# Patient Record
Sex: MALE | Race: WHITE | Employment: OTHER | URBAN - METROPOLITAN AREA
[De-identification: names, ages, dates, MRNs, and addresses within clinical notes are randomized per-mention and may not be internally consistent; named-entity substitution may affect disease eponyms.]

---

## 2017-02-27 ENCOUNTER — OFFICE VISIT (OUTPATIENT)
Dept: UROLOGY | Facility: CLINIC | Age: 82
End: 2017-02-27
Payer: MEDICARE

## 2017-02-27 ENCOUNTER — TELEPHONE (OUTPATIENT)
Dept: UROLOGY | Facility: CLINIC | Age: 82
End: 2017-02-27

## 2017-02-27 VITALS — SYSTOLIC BLOOD PRESSURE: 113 MMHG | DIASTOLIC BLOOD PRESSURE: 75 MMHG | HEART RATE: 91 BPM

## 2017-02-27 DIAGNOSIS — R33.9 BLADDER RETENTION: Primary | ICD-10-CM

## 2017-02-27 PROCEDURE — 99213 OFFICE O/P EST LOW 20 MIN: CPT | Performed by: UROLOGY

## 2017-02-27 NOTE — TELEPHONE ENCOUNTER
Discussed patient with nurse and provider. Patient scheduled today for a clinic appointment.    Kathy Cunningham [facility]

## 2017-02-27 NOTE — TELEPHONE ENCOUNTER
[facility] Call Center    Phone Message    Name of Caller:Sonja    Phone Number: [phone]  Best time to return call: any    May a detailed message be left on voicemail: yes    Relation to patient: Other Name: Sonja  Relationship: Sonja  Is there legal documentation in chart to discuss information with this person: Yes. Legal Documentation is verified by [facility].    Reason for Call: Patients daughter, Sonja, called and said her dad had called earlier and was told to go to the [facility] ER,  patient needs catheter put back in, and has some bleeding, since 8:00 am , please call to advise    Action Taken: Message routed to:  Adult Clinics: Urology p [id number]

## 2017-02-27 NOTE — MR AVS SNAPSHOT
aPblo Fernandez     (MR # [id number])              After Visit Summary      Visit Information     Date & Time Provider Department Encounter #    2/27/2017  2:30 PM Dave Jorge MD [facility] [id number]      Vitals  Most recent update: 2/27/2017  2:42 PM by Junior Soto CMA    BP Pulse                113/75 91          Reason for visit     RECHECK unable to cath since 8:00 am     Reason for Visit History      Diagnoses        Codes Comments    Bladder retention    -  Primary R33.9       Appointments for Next 1 Year     None      Follow-up and Disposition     Return if symptoms worsen or fail to improve.    Follow-up and Disposition History      Health Maintenance Due     Health Maintenance Due   Topic Date Due    TETANUS IMMUNIZATION (SYSTEM ASSIGNED)  10/03/1950    FALL RISK ASSESSMENT  10/03/1997    PNEUMOCOCCAL (1 of 2 - PCV13) 10/03/1997    INFLUENZA VACCINE (SYSTEM ASSIGNED)  09/01/2016             Ongoing Health Issues     Problem Noted Resolved    Hypercholesterolemia[478682]      Gout[618469]      Asthma[003521]      BPH (benign prostatic hyperplasia)[988460] 1/27/2012     Hip arthritis[768330] 3/18/2013     Advanced directives, counseling/discussion[209457] 3/20/2013     Pleural effusion[488871] 4/9/2013     Anemia due to blood loss, acute[920249] 4/9/2013     Respiratory failure (H)[098154] 4/9/2013     Constipation[235404] 4/12/2013     S/P FELIX[537159] 4/15/2013     Physical deconditioning[441041] 5/10/2013     Pneumonia[438283] 5/16/2013     CHF (congestive heart failure) (H)[768631] 6/6/2013     Urine retention[026588] 6/6/2013     A-fib (H)[580415] 6/12/2013     Ischemic cardiomyopathy[196506] 6/9/2014     Pulmonary hypertension (H)[518496] 6/9/2014     CAD (coronary artery disease)[108176]      Neurogenic bladder[369782] 10/28/2014     Bladder retention[804378] 4/15/2015     Paroxysmal ventricular tachycardia (H)[427.1.ICD-9-CM] 4/28/2015     Urinary retention[645708]  8/29/2015        IMPORTANT INFORMATION  For all doctor appointments, please bring your medications in their original containers .   For all prescription refills please contact your pharmacy directly one week prior to your last dose and request a refill. The pharmacy will then contact us.  If you have any lab tests ordered please call [phone] to schedule a lab appointment.  To contact [facility] call [phone] / for [facility] in [facility] call [phone] / for [facility] in [facility] call [phone].    Pharm[facility]:    [facility]  WRITTEN PRESCRIPTION REQUESTED  [facility]  [facility] [place] [address]    * * * * * * * PATIENT'S COPY* * * * * * * * * *              Stop taking these medicines if you haven't already. Please contact your care team if you have questions.        Reason for Discontinue    carvedilol (COREG) 25 MG tablet Discontinued by another Health Care Provider

## 2017-02-27 NOTE — MR AVS SNAPSHOT
Patient's Medication List   [facility] in [place]       Patient:  GAYATRI BANKS   :  1932   Physician:  Fredy Harmon MD           This is your record.  Keep this with you and show to your community pharmacist(s) and physician(s) at each visit.     Allergies:  ABCIXIMAB - (reactions not documented)     ATORVASTATIN - Other (See Comments)     CONTRAST DYE - Other (See Comments),Nausea               Medications  Valid as of: 2017 - 10:58 PM    Generic Name Brand Name Tablet Size Instructions for use    Albuterol Sulfate   Inhale 2 puffs into the lungs as needed        Apixaban ELIQUIS 5 MG Take 5 mg by mouth 2 times daily        Aspirin aspirin 81 MG Take 1 tablet by mouth daily.        Cyanocobalamin vitamin  B-12 100 MCG Take 1 tablet by mouth daily.        Folic Acid FOLVITE 1 MG Take 1 tablet by mouth daily.        Levothyroxine Sodium SYNTHROID/LEVOTHROID 25 MCG Take 1 tablet by mouth every morning (before breakfast).        Metoprolol Tartrate   Take 25 mg by mouth 2 times daily        Mirtazapine REMERON 15 MG Take 30 mg by mouth At Bedtime         Montelukast Sodium SINGULAIR 10 MG Take 1 tablet by mouth At Bedtime.        Nutritional Supplements ENSURE PLUS HN  Take 8 oz by mouth Before bedtime        Simvastatin ZOCOR 80 MG Take 40 mg by mouth At Bedtime Take 1/2 tablet Daily        Spironolactone ALDACTONE 25 MG Take 0.5 tablets (12.5 mg) by mouth daily        Valsartan DIOVAN 40 MG Take 80 mg by mouth daily        .           .           .           .

## 2017-02-27 NOTE — NURSING NOTE
"Pablo Fernandez's goals for this visit include:   Chief Complaint   Patient presents with     RECHECK     unable to cath since 8:00 am        He requests these members of his care team be copied on today's visit information yes     PCP: Fredy Harmon    Referring Provider:  No referring provider defined for this encounter.    Chief Complaint   Patient presents with     RECHECK     unable to cath since 8:00 am        Initial /75  Pulse 91 Estimated body mass index is 21.33 kg/(m^2) as calculated from the following:    Height as of 8/26/15: 1.803 m (5' 11\").    Weight as of 8/26/15: 69.4 kg (152 lb 14.4 oz).  Medication Reconciliation: complete    Do you need any medication refills at today's visit?       "

## 2017-02-27 NOTE — TELEPHONE ENCOUNTER
Daughter Sonja called stating that her dad self caths and occasionally gets strictures. He was able to get a small amount of urine out at 8:00 this morning but is not able to get the catheter in at all since. Please advise.    Junior Soto,CMA

## 2017-02-27 NOTE — LETTER
2/27/2017       RE: Pablo Fernandez  [address]  [facility] [address]     Dear Colleague,    Thank you for referring your patient, Pablo Fernandez, to the [facility] at [facility]. Please see a copy of my visit note below.    No notes on file    Again, thank you for allowing me to participate in the care of your patient.      Sincerely,    Dave Jorge MD

## 2017-02-28 NOTE — PROGRESS NOTES
Pt well known to urology.    History of BPH and urethral strictures.  He does self cath about 8 times in 24 hours.  He notes some difficulty passing the 14 Latvian coude catheters, he meets a tight stricture at about 5 inches in, but generally can pass the catheter OK each time.    I discussed with him and his family again the various options of:  1. Continue  self cathing  2. Try indwelling barnett for a while to help passively dilate strictures.  3. Urethral dilation with sounds to stretch strictures. (may scar down tighter).  4. Cystoscopy and DVIU under anesthesia.  5. Leave chronic barnett  6. Chronic SPT.    He demonstrated that he was able to catheterize himself in clinic today.   There was no blood and the CIC was snug but not impossible.    I advised to keep catheterizing as he is.  If there is increasing trouble passing the catheter, we can think about a procedure in the future.    15min visit, over 50% face to face in counseling/discussion of above issues.

## 2017-09-21 ENCOUNTER — TELEPHONE (OUTPATIENT)
Dept: UROLOGY | Facility: CLINIC | Age: 82
End: 2017-09-21

## 2017-09-21 NOTE — TELEPHONE ENCOUNTER
Call patient to remind and assist in scheduling yearly follow up with Dr. Jorge due around 11/21/17. I was unable to reach pt left message to return call to clinic.

## 2017-09-29 NOTE — TELEPHONE ENCOUNTER
Patient was contacted and was scheduled on 11/20/17 with Dr. Jorge for annual follow up.    Kathy Cunningham CMA

## 2017-10-25 ENCOUNTER — MEDICAL CORRESPONDENCE (OUTPATIENT)
Dept: HEALTH INFORMATION MANAGEMENT | Facility: CLINIC | Age: 82
End: 2017-10-25

## 2017-11-20 ENCOUNTER — MEDICAL CORRESPONDENCE (OUTPATIENT)
Dept: HEALTH INFORMATION MANAGEMENT | Facility: CLINIC | Age: 82
End: 2017-11-20

## 2017-11-20 ENCOUNTER — OFFICE VISIT (OUTPATIENT)
Dept: UROLOGY | Facility: CLINIC | Age: 82
End: 2017-11-20
Payer: MEDICARE

## 2017-11-20 VITALS — OXYGEN SATURATION: 98 % | SYSTOLIC BLOOD PRESSURE: 121 MMHG | HEART RATE: 70 BPM | DIASTOLIC BLOOD PRESSURE: 72 MMHG

## 2017-11-20 DIAGNOSIS — R33.9 URINE RETENTION: Primary | ICD-10-CM

## 2017-11-20 PROCEDURE — 99212 OFFICE O/P EST SF 10 MIN: CPT | Performed by: UROLOGY

## 2017-11-20 RX ORDER — AMIODARONE HYDROCHLORIDE 200 MG/1
400 TABLET ORAL
COMMUNITY
Start: 2017-10-12

## 2017-11-20 NOTE — MR AVS SNAPSHOT
After Visit Summary   2017    Pablo Fernandez    MRN: [id number]           Patient Information     Date Of Birth          10/3/1932        Visit Information        Provider Department      2017 11:00 AM Dave Jorge MD [facility]        Today's Diagnoses     Urine retention    -  1       Follow-ups after your visit        Follow-up notes from your care team     Return in about 1 year (around 2018), or if symptoms worsen or fail to improve.      Who to contact     If you have questions or need follow up information about today's clinic visit or your schedule please contact [facility] directly at [phone].  Normal or non-critical lab and imaging results will be communicated to you by MyChart, letter or phone within 4 business days after the clinic has received the results. If you do not hear from us within 7 days, please contact the clinic through MyChart or phone. If you have a critical or abnormal lab result, we will notify you by phone as soon as possible.  Submit refill requests through [employer] or call your pharmacy and they will forward the refill request to us. Please allow 3 business days for your refill to be completed.          Additional Information About Your Visit        MyChart Information     [employer] is an electronic gateway that provides easy, online access to your medical records. With [employer], you can request a clinic appointment, read your test results, renew a prescription or communicate with your care team.     To sign up for [employer] visit the website at [email or web address]   You will be asked to enter the access code listed below, as well as some personal information. Please follow the directions to create your username and password.     Your access code is: [email or web address]  Expires: 2018 12:44 PM     Your access code will  in 90 days. If you need help or a new code, please contact your [facility] or call [phone] for assistance.        Care EveryWhere ID     This is your Care EveryWhere ID. This could be used by other organizations to access your [facility] medical records  [id number]        Your Vitals Were     Pulse Pulse Oximetry                70 98%           Blood Pressure from Last 3 Encounters:   11/20/17 121/72   02/27/17 113/75   11/21/16 100/61    Weight from Last 3 Encounters:   08/26/15 69.4 kg (152 lb 14.4 oz)   04/28/15 69.3 kg (152 lb 12.8 oz)   04/15/15 68 kg (150 lb)              Today, you had the following     No orders found for display       Primary Care Provider Office Phone # Fax #    Fredy Harmon [phone]       [facility] [address]  [facility] [address]        Equal Access to Services     [facility] : Hadii aad ku hadasho Soomaali, waaxda luqadaha, qaybta kaalmada adeegyada, waxay idiin hayaan adeivon rand . So [facility] [phone].    ATENCIÓN: Si habla español, tiene a levine disposición servicios gratuitos de asistencia lingüística. Aaron al [phone].    We comply with applicable federal civil rights laws and [place] laws. We do not discriminate on the basis of race, color, national origin, age, disability, sex, sexual orientation, or gender identity.            Thank you!     Thank you for choosing [facility]  for your care. Our goal is always to provide you with excellent care. Hearing back from our patients is one way we can continue to improve our services. Please take a few minutes to complete the written survey that you may receive in the mail after your visit with us. Thank you!             Your Updated Medication List - Protect others around you: Learn how to safely use, store and throw away your medicines at www.disposemymeds.org.          This list is accurate as of: 11/20/17 12:44 PM.  Always use your most recent med list.                   Brand Name  Dispense Instructions for use Diagnosis    amiodarone 200 MG tablet    PACERONE/CODARONE     Take 400 mg by mouth        * cyanocobalamin 1000 MCG Tabs      Take 1,000 mcg by mouth        * cyanocobalamin 100 MCG Tabs tablet    vitamin  B-12    30 tablet    Take 1 tablet by mouth daily.    Poor appetite       ELIQUIS 5 MG tablet   Generic drug:  apixaban ANTICOAGULANT      Take 5 mg by mouth 2 times daily        ENSURE PLUS HN Liqd      Take 8 oz by mouth Before bedtime        folic acid 1 MG tablet    FOLVITE    90 tablet    Take 1 tablet by mouth daily.    Hip arthritis       levothyroxine 25 MCG tablet    SYNTHROID/LEVOTHROID    30 tablet    Take 1 tablet by mouth every morning (before breakfast).    Hypothyroidism       METOPROLOL TARTRATE PO      Take 25 mg by mouth 2 times daily        mirtazapine 15 MG tablet    REMERON     Take 30 mg by mouth At Bedtime        montelukast 10 MG tablet    SINGULAIR    30 tablet    Take 1 tablet by mouth At Bedtime.    Asthma       spironolactone 25 MG tablet    ALDACTONE    30 tablet    Take 0.5 tablets (12.5 mg) by mouth daily    Ischemic cardiomyopathy       valsartan 40 MG tablet    DIOVAN     Take 80 mg by mouth daily        VENTOLIN HFA IN      Inhale 2 puffs into the lungs as needed        * Notice:  This list has 2 medication(s) that are the same as other medications prescribed for you. Read the directions carefully, and ask your doctor or other care provider to review them with you.

## 2017-11-20 NOTE — LETTER
11/20/2017      RE: Pablo Fernandez  [address]  [place] [address]         Pt well known to urology.    History of BPH and urethral strictures.  He does self cath about 6 times in 24 hours.  He notes some difficulty passing the 14 Haitian coude catheters, he meets a tight stricture at about 5 inches in, but generally can pass the catheter OK each time.  He denies gross hematuria and cathing overall is going fine.    I advised to keep catheterizing as he is.  If there is increasing trouble passing the catheter, we can think about a procedure in the future.  He needed a refill of the catheters.    Diagnosis urinary retention and urethral stricture.    5 min visit, over 50% face to face in discussing cathing symptoms and refilling prescription.    Mildred Jorge MD

## 2017-11-20 NOTE — NURSING NOTE
".  Pablo Fernandez's goals for this visit include:  He requests these members of his care team be copied on today's visit information:     PCP: Fredy Harmon    Referring Provider:  No referring provider defined for this encounter.    Chief Complaint   Patient presents with     RECHECK       Initial /72 (Patient Position: Sitting, Cuff Size: Adult Regular)  Pulse 70  SpO2 98% Estimated body mass index is 21.33 kg/(m^2) as calculated from the following:    Height as of 8/26/15: 1.803 m (5' 11\").    Weight as of 8/26/15: 69.4 kg (152 lb 14.4 oz).  Medication Reconciliation: complete    Do you need any medication refills at today's visit? No  Chief Complaint   Patient presents with     RECHECK             "

## 2017-11-20 NOTE — PROGRESS NOTES
Pt well known to urology.    History of BPH and urethral strictures.  He does self cath about 6 times in 24 hours.  He notes some difficulty passing the 14 Faroese coude catheters, he meets a tight stricture at about 5 inches in, but generally can pass the catheter OK each time.  He denies gross hematuria and cathing overall is going fine.    I advised to keep catheterizing as he is.  If there is increasing trouble passing the catheter, we can think about a procedure in the future.  He needed a refill of the catheters.    Diagnosis urinary retention and urethral stricture.    5 min visit, over 50% face to face in discussing cathing symptoms and refilling prescription.    Mildred OLMSTEAD

## 2018-08-31 ENCOUNTER — RECORDS - HEALTHEAST (OUTPATIENT)
Dept: LAB | Facility: CLINIC | Age: 83
End: 2018-08-31

## 2018-08-31 LAB
ANION GAP SERPL CALCULATED.3IONS-SCNC: 9 MMOL/L (ref 5–18)
BUN SERPL-MCNC: 16 MG/DL (ref 8–28)
CALCIUM SERPL-MCNC: 9.3 MG/DL (ref 8.5–10.5)
CHLORIDE BLD-SCNC: 98 MMOL/L (ref 98–107)
CO2 SERPL-SCNC: 28 MMOL/L (ref 22–31)
CREAT SERPL-MCNC: 1.07 MG/DL (ref 0.7–1.3)
ERYTHROCYTE [DISTWIDTH] IN BLOOD BY AUTOMATED COUNT: 14.6 % (ref 11–14.5)
GFR SERPL CREATININE-BSD FRML MDRD: >60 ML/MIN/1.73M2
GLUCOSE BLD-MCNC: 54 MG/DL (ref 70–125)
HCT VFR BLD AUTO: 40.3 % (ref 40–54)
HGB BLD-MCNC: 13.4 G/DL (ref 14–18)
MCH RBC QN AUTO: 32.4 PG (ref 27–34)
MCHC RBC AUTO-ENTMCNC: 33.3 G/DL (ref 32–36)
MCV RBC AUTO: 97 FL (ref 80–100)
PLATELET # BLD AUTO: 192 THOU/UL (ref 140–440)
PMV BLD AUTO: 9.6 FL (ref 8.5–12.5)
POTASSIUM BLD-SCNC: 4 MMOL/L (ref 3.5–5)
RBC # BLD AUTO: 4.14 MILL/UL (ref 4.4–6.2)
SODIUM SERPL-SCNC: 135 MMOL/L (ref 136–145)
TSH SERPL DL<=0.005 MIU/L-ACNC: 2.61 UIU/ML (ref 0.3–5)
VIT B12 SERPL-MCNC: 1214 PG/ML (ref 213–816)
WBC: 8.8 THOU/UL (ref 4–11)

## 2018-09-20 ENCOUNTER — RECORDS - HEALTHEAST (OUTPATIENT)
Dept: LAB | Facility: CLINIC | Age: 83
End: 2018-09-20

## 2018-09-20 LAB
ANION GAP SERPL CALCULATED.3IONS-SCNC: 8 MMOL/L (ref 5–18)
BUN SERPL-MCNC: 19 MG/DL (ref 8–28)
CALCIUM SERPL-MCNC: 9.3 MG/DL (ref 8.5–10.5)
CHLORIDE BLD-SCNC: 99 MMOL/L (ref 98–107)
CO2 SERPL-SCNC: 28 MMOL/L (ref 22–31)
CREAT SERPL-MCNC: 1.04 MG/DL (ref 0.7–1.3)
GFR SERPL CREATININE-BSD FRML MDRD: >60 ML/MIN/1.73M2
GLUCOSE BLD-MCNC: 65 MG/DL (ref 70–125)
POTASSIUM BLD-SCNC: 4.1 MMOL/L (ref 3.5–5)
SODIUM SERPL-SCNC: 135 MMOL/L (ref 136–145)

## 2018-10-24 ENCOUNTER — RECORDS - HEALTHEAST (OUTPATIENT)
Dept: LAB | Facility: CLINIC | Age: 83
End: 2018-10-24

## 2018-10-25 LAB
ANION GAP SERPL CALCULATED.3IONS-SCNC: 8 MMOL/L (ref 5–18)
BUN SERPL-MCNC: 17 MG/DL (ref 8–28)
CALCIUM SERPL-MCNC: 8.8 MG/DL (ref 8.5–10.5)
CHLORIDE BLD-SCNC: 97 MMOL/L (ref 98–107)
CO2 SERPL-SCNC: 27 MMOL/L (ref 22–31)
CREAT SERPL-MCNC: 0.91 MG/DL (ref 0.7–1.3)
GFR SERPL CREATININE-BSD FRML MDRD: >60 ML/MIN/1.73M2
GLUCOSE BLD-MCNC: 76 MG/DL (ref 70–125)
POTASSIUM BLD-SCNC: 4.1 MMOL/L (ref 3.5–5)
SODIUM SERPL-SCNC: 132 MMOL/L (ref 136–145)

## 2018-12-27 ENCOUNTER — RECORDS - HEALTHEAST (OUTPATIENT)
Dept: LAB | Facility: CLINIC | Age: 83
End: 2018-12-27

## 2018-12-28 LAB
ANION GAP SERPL CALCULATED.3IONS-SCNC: 7 MMOL/L (ref 5–18)
BUN SERPL-MCNC: 14 MG/DL (ref 8–28)
CALCIUM SERPL-MCNC: 9 MG/DL (ref 8.5–10.5)
CHLORIDE BLD-SCNC: 98 MMOL/L (ref 98–107)
CO2 SERPL-SCNC: 27 MMOL/L (ref 22–31)
CREAT SERPL-MCNC: 1.05 MG/DL (ref 0.7–1.3)
GFR SERPL CREATININE-BSD FRML MDRD: >60 ML/MIN/1.73M2
GLUCOSE BLD-MCNC: 102 MG/DL (ref 70–125)
POTASSIUM BLD-SCNC: 4.2 MMOL/L (ref 3.5–5)
SODIUM SERPL-SCNC: 132 MMOL/L (ref 136–145)